# Patient Record
Sex: MALE | Race: BLACK OR AFRICAN AMERICAN | URBAN - METROPOLITAN AREA
[De-identification: names, ages, dates, MRNs, and addresses within clinical notes are randomized per-mention and may not be internally consistent; named-entity substitution may affect disease eponyms.]

---

## 2019-02-25 ENCOUNTER — HISTORICAL (OUTPATIENT)
Dept: ADMINISTRATIVE | Facility: HOSPITAL | Age: 74
End: 2019-02-25

## 2022-04-07 ENCOUNTER — HISTORICAL (OUTPATIENT)
Dept: ADMINISTRATIVE | Facility: HOSPITAL | Age: 77
End: 2022-04-07

## 2022-04-23 VITALS
DIASTOLIC BLOOD PRESSURE: 68 MMHG | SYSTOLIC BLOOD PRESSURE: 130 MMHG | HEIGHT: 70 IN | BODY MASS INDEX: 26.2 KG/M2 | WEIGHT: 183 LBS

## 2022-05-01 NOTE — HISTORICAL [facility]ER
This is a historical note converted from Isaiah. Formatting and pictures may have been removed.  Please reference Isaiah for original formatting and attached multimedia. Chief Complaint  6 WEEKS F/U B/L KNEE. Pt stated the pain is only at night.  History of Present Illness  Mr. Cruz comes in today for evaluation of his bilateral knee pain.?He does state that this is mild today. His only discomfort is at night. He denies any pain?during the day, with weightbearing, with activity.?He was referred to Dr. Ji for evaluation of?prefer arterial disease.?He was seen and evaluated and diagnosed with?PVD.?Ultrasounds?of the leg show that this is mild?femoral artery stenosis. No surgical intervention needed at this time.  Review of Systems  Systemic: No fever, no chills, and no recent weight change.  Head: No headache - frequent.  Eyes: No vision problems.  Otolarnygeal: No hearing loss, no earache, no epistaxis, no hoarseness, and no tooth pain. Gums normal.  Cardiovascular: No chest pain or discomfort and no palpitations.  Pulmonary: No pulmonary symptoms - difficulty sleeping, no dyspnea, and cough not worse in the morning.  Gastrointestinal: Appetite not decreased. No dysphagia and no constant eructation. No nausea, no vomiting, no abdominal pain, no hematochezia, and no loose/mushy stools - frequent. No constipation - frequent.  Genitourinary: No genitourinary symptoms - Getting up every night to urinate and no increase in urinary frequency. No urinary hesitancy. No urinary loss of control - difficulty stopping urination and no burning sensation during urination.  Musculoskeletal: No calf muscle cramps and no localized soft tissue swelling of the ankle.  Neurological: No fainting and no convulsions.  Psychological: Not feeling nervous tension, not feeling nervous from exhaustion, and no depression.  Skin: No rash. Previous history of no ulcers.  ?  ?  Physical Exam  Vitals & Measurements  BP:?130/68?  HT:?178?cm?  WT:?83.00?kg? BMI:?26.2?  Musculoskeletal System:  left?Knee:  Knee Effusion Grade:??????????????Value????Normal Range  Grade effusion?????????????? 1  active flexion????????? 120  active extension?????5  ?Anteromedial aspect was tender on palpation.??Medial aspect was tender on palpation.??Medial collateral ligament was tender on palpation.  No lateral compartment tenderness?  _Negative medila and lateral?Tommy test  _Negative lachman test  No instability on varus or valgus stress  no palpable pedal pulses  Foot:  left?Foot: ??No excessive pronation. ??No excessive supination.  Neurological:  ??Oriented to time, place, and person.  Motor (Strength): ??No weakness of the?left knee was observed. Gait And Stance:????  Skin:  ??no erythema, ecchymosis or increased heat  ?   left? knee xrays taken today show mild degenarative changes in all three compartments.  ?  ?   Musculoskeletal System:  Right?Knee:  Knee Effusion Grade:??????????????Value????Normal Range  Grade effusion?????????????? 0  active flexion????????? 120  active extension?????5  ?Anteromedial aspect was tender on palpation.??Medial aspect was tender on palpation.??Medial collateral ligament was tender on palpation.  No lateral compartment tenderness  negative Tommy test  Negative?lachman test  No instability on varus or valgus stress  No palpable pedal pulses  Foot:  right Foot: ??No excessive pronation. ??No excessive supination.  Neurological:  ??Oriented to time, place, and person.  Motor (Strength): ??No weakness of the?right knee was observed. Gait And Stance:????  Skin:  ??no erythema, ecchymosis or increased heat  ?  ?  right knee xrays taken today show mild degenarative changes in all three compartments  Assessment/Plan  1.?Primary osteoarthritis of right knee?M17.11  ? Recommend?conservative treatment consisting of low impact exercising, ice, over-the-counter anti-inflammatories as needed for pain. We did discuss corticosteroid  injections?once his pain?is more moderate and persistent.?He denies that at this time.?He will follow-up with us as needed.  Ordered:  Office/Outpatient Visit Level 3 Established [id number] PC, Primary osteoarthritis of right knee  Primary osteoarthritis of left knee, [facility], 02/25/19 9:03:00 CST  XR Knee Left 4 or More Views, Routine, 02/25/19 8:45:00 CST, Pain, None, Ambulatory, Rad Type, Primary osteoarthritis of right knee  Primary osteoarthritis of left knee, Not Scheduled, 02/25/19 8:45:00 CST  XR Knee Right 1 or 2 Views, Routine, 02/25/19 8:45:00 CST, Pain, None, Ambulatory, Rad Type, Primary osteoarthritis of right knee  Primary osteoarthritis of left knee, Not Scheduled, 02/25/19 8:45:00 CST  ?  2.?Primary osteoarthritis of left knee?M17.12  Ordered:  Office/Outpatient Visit Level 3 Established [id number] PC, Primary osteoarthritis of right knee  Primary osteoarthritis of left knee, [facility], 02/25/19 9:03:00 CST  XR Knee Left 4 or More Views, Routine, 02/25/19 8:45:00 CST, Pain, None, Ambulatory, Rad Type, Primary osteoarthritis of right knee  Primary osteoarthritis of left knee, Not Scheduled, 02/25/19 8:45:00 CST  XR Knee Right 1 or 2 Views, Routine, 02/25/19 8:45:00 CST, Pain, None, Ambulatory, Rad Type, Primary osteoarthritis of right knee  Primary osteoarthritis of left knee, Not Scheduled, 02/25/19 8:45:00 CST  ?  Orders:  Clinic Follow-up PRN, 02/25/19 9:04:00 CST, Future Order, [facility]   Problem List/Past Medical History  Ongoing  Gout  Hypertension  Peripheral arterial disease  Primary osteoarthritis of left knee  Primary osteoarthritis of right knee  Historical  No qualifying data  Procedure/Surgical History  angiogram   Medications  ALLOPURINOL 300 MG TABLET, 300 mg= 1 tab(s), Oral, Daily  AMLODIPINE BESYLATE 10 MG TABS, 10 mg= 1 tab(s), Oral, Daily  aspirin 81 mg oral tablet, 81 mg= 1 tab(s), Oral, Daily  ATORVASTATIN 40 MG TABLET, 40 mg= 1 tab(s), Oral,  Daily  CARVEDILOL 6.25 MG TABS, 6.25 mg= 1 tab(s), Oral, BID  Co-Q10, Oral  diclofenac 35 mg oral capsule, 35 mg= 1 cap(s), Oral, TID, PRN  HYDROCHLOROTHIAZIDE 25 MG TAB, 25 mg= 1 tab(s), Oral, Daily  LISINOPRIL 40 MG TABLET, 40 mg= 1 tab(s), Oral, Daily  MELOXICAM 7.5 MG TABLET, 7.5 mg= 1 tab(s), Oral, Daily  NexIUM, Oral, Daily  Omega-3 oral capsule  TRAMADOL HCL 50 MG TABLET, 50 mg= 1 tab(s), Oral, BID  Vitamin D3 1000 intl units oral capsule, 1000 IntUnit= 1 cap(s), Oral, Daily  Allergies  No Known Allergies  Social History  Alcohol  Current, 1-2 times per month, 01/14/2019  Employment/School  Retired, 01/14/2019  Tobacco  Former smoker, quit more than 30 days ago, No, 02/25/2019  Former smoker, quit more than 30 days ago, N/A, 01/14/2019  Family History  Heart disease: Mother.  Primary malignant neoplasm of lung: Father.  Health Maintenance  Health Maintenance  ???Pending?(in the next year)  ??? ??OverDue  ??? ? ? ?Coronary Artery Disease Maintenance-Antiplatelet Agent Prescribed due??and every?  ??? ? ? ?Coronary Artery Disease Maintenance-Lipid Lowering Therapy due??and every?  ??? ??Due?  ??? ? ? ?ADL Screening due??02/25/19??and every 1??year(s)  ??? ? ? ?Advance Directive due??02/25/19??and every 1??year(s)  ??? ? ? ?Alcohol Misuse Screening due??02/25/19??and every 1??year(s)  ??? ? ? ?Cognitive Screening due??02/25/19??and every 1??year(s)  ??? ? ? ?Colorectal Screening (Senior Wellness) due??02/25/19??and every?  ??? ? ? ?Functional Assessment due??02/25/19??and every 1??year(s)  ??? ? ? ?Geriatric Depression Screening due??02/25/19??and every 1??year(s)  ??? ? ? ?Hypertension Management-BMP due??02/25/19??and every?  ??? ? ? ?Hypertension Management-Education due??02/25/19??and every 1??year(s)  ??? ? ? ?Pneumococcal Vaccine due??02/25/19??Variable frequency  ??? ? ? ?Pneumococcal Vaccine due??02/25/19??and every?  ??? ? ? ?Smoking Cessation due??02/25/19??Variable frequency  ??? ? ? ?Tetanus Vaccine  due??02/25/19??and every 10??year(s)  ??? ? ? ?Zoster Vaccine due??02/25/19??and every 100??year(s)  ??? ??Due In Future?  ??? ? ? ?Smoking Cessation (Coronary Artery Disease) not due until??03/22/19??and every 2??year(s)  ??? ? ? ?Hypertension Management-Blood Pressure not due until??01/14/20??and every 1??year(s)  ??? ? ? ?Fall Risk Assessment not due until??01/14/20??and every 1??year(s)  ??? ? ? ?Aspirin Therapy for CVD Prevention not due until??01/14/20??and every 1??year(s)  ???Satisfied?(in the past 1 year)  ??? ??Satisfied?  ??? ? ? ?Aspirin Therapy for CVD Prevention on??01/14/19.  ??? ? ? ?Blood Pressure Screening on??02/25/19.??Satisfied by Emelyn Osorio LPN  ??? ? ? ?Body Mass Index Check on??02/25/19.??Satisfied by Emelyn Osorio LPN  ??? ? ? ?Coronary Artery Disease Maintenance-Antiplatelet Agent Prescribed on??01/14/19.  ??? ? ? ?Depression Screening on??01/23/19.??Satisfied by Simin Vora RN  ??? ? ? ?Fall Risk Assessment on??01/14/19.??Satisfied by Emelyn Osorio LPN  ??? ? ? ?Hypertension Management-Blood Pressure on??02/25/19.??Satisfied by Emelyn Osorio LPN  ??? ? ? ?Influenza Vaccine on??01/23/19.??Satisfied by Simin Vora RN  ??? ? ? ?Obesity Screening on??02/25/19.??Satisfied by Emelyn Osorio LPN  ?  ?